# Patient Record
Sex: MALE | Race: WHITE | NOT HISPANIC OR LATINO | Employment: FULL TIME | URBAN - METROPOLITAN AREA
[De-identification: names, ages, dates, MRNs, and addresses within clinical notes are randomized per-mention and may not be internally consistent; named-entity substitution may affect disease eponyms.]

---

## 2022-02-11 ENCOUNTER — APPOINTMENT (EMERGENCY)
Dept: CT IMAGING | Facility: HOSPITAL | Age: 42
End: 2022-02-11
Payer: OTHER MISCELLANEOUS

## 2022-02-11 ENCOUNTER — HOSPITAL ENCOUNTER (EMERGENCY)
Facility: HOSPITAL | Age: 42
Discharge: HOME | End: 2022-02-11
Attending: EMERGENCY MEDICINE
Payer: OTHER MISCELLANEOUS

## 2022-02-11 VITALS
BODY MASS INDEX: 31.1 KG/M2 | HEIGHT: 69 IN | WEIGHT: 210 LBS | RESPIRATION RATE: 20 BRPM | HEART RATE: 97 BPM | DIASTOLIC BLOOD PRESSURE: 105 MMHG | TEMPERATURE: 97.8 F | SYSTOLIC BLOOD PRESSURE: 161 MMHG | OXYGEN SATURATION: 100 %

## 2022-02-11 DIAGNOSIS — M54.50 ACUTE LOW BACK PAIN: Primary | ICD-10-CM

## 2022-02-11 PROCEDURE — G1004 CDSM NDSC: HCPCS

## 2022-02-11 PROCEDURE — 96374 THER/PROPH/DIAG INJ IV PUSH: CPT

## 2022-02-11 PROCEDURE — 96375 TX/PRO/DX INJ NEW DRUG ADDON: CPT

## 2022-02-11 PROCEDURE — 72131 CT LUMBAR SPINE W/O DYE: CPT

## 2022-02-11 PROCEDURE — 99284 EMERGENCY DEPT VISIT MOD MDM: CPT

## 2022-02-11 PROCEDURE — 99285 EMERGENCY DEPT VISIT HI MDM: CPT | Performed by: PHYSICIAN ASSISTANT

## 2022-02-11 RX ORDER — METHYLPREDNISOLONE 4 MG/1
TABLET ORAL
Qty: 21 TABLET | Refills: 0 | Status: SHIPPED | OUTPATIENT
Start: 2022-02-11

## 2022-02-11 RX ORDER — DIAZEPAM 5 MG/1
5 TABLET ORAL EVERY 8 HOURS PRN
Qty: 15 TABLET | Refills: 0 | Status: SHIPPED | OUTPATIENT
Start: 2022-02-11 | End: 2022-02-16

## 2022-02-11 RX ORDER — IBUPROFEN 600 MG/1
600 TABLET ORAL EVERY 8 HOURS PRN
Qty: 30 TABLET | Refills: 0 | Status: SHIPPED | OUTPATIENT
Start: 2022-02-11

## 2022-02-11 RX ORDER — DEXAMETHASONE SODIUM PHOSPHATE 10 MG/ML
10 INJECTION, SOLUTION INTRAMUSCULAR; INTRAVENOUS ONCE
Status: COMPLETED | OUTPATIENT
Start: 2022-02-11 | End: 2022-02-11

## 2022-02-11 RX ORDER — HYDROMORPHONE HCL/PF 1 MG/ML
0.5 SYRINGE (ML) INJECTION ONCE
Status: COMPLETED | OUTPATIENT
Start: 2022-02-11 | End: 2022-02-11

## 2022-02-11 RX ORDER — ONDANSETRON 2 MG/ML
4 INJECTION INTRAMUSCULAR; INTRAVENOUS ONCE
Status: COMPLETED | OUTPATIENT
Start: 2022-02-11 | End: 2022-02-11

## 2022-02-11 RX ADMIN — HYDROMORPHONE HYDROCHLORIDE 0.5 MG: 1 INJECTION, SOLUTION INTRAMUSCULAR; INTRAVENOUS; SUBCUTANEOUS at 13:38

## 2022-02-11 RX ADMIN — ONDANSETRON 4 MG: 2 INJECTION INTRAMUSCULAR; INTRAVENOUS at 13:38

## 2022-02-11 RX ADMIN — DEXAMETHASONE SODIUM PHOSPHATE 10 MG: 10 INJECTION, SOLUTION INTRAMUSCULAR; INTRAVENOUS at 14:50

## 2022-02-11 NOTE — Clinical Note
Kamila Almeida was seen and treated in our emergency department on 2/11/2022  No restrictions            Diagnosis:     Torsten Hirsch  may return to work on return date  He may return on this date: 02/14/2022         If you have any questions or concerns, please don't hesitate to call        Breana Porter PA-C    ______________________________           _______________          _______________  Hospital Representative                              Date                                Time

## 2022-02-13 NOTE — ED PROVIDER NOTES
History  Chief Complaint   Patient presents with    Back Pain     Pt reports lower back pain after lifting an 800 lb spa  Pt unable to bend and is having a hard time walking     39 y o  male presents to the Emergency Department with chief complaint of back pain after lifting an 800 lb spa earlier today   Onset of symptoms is reported as this afternoon  Location of symptoms is reported as lower back  Quality of symptoms is reported as sharp tight pain  Severity of symptoms is reported as moderate-severe  Associated symptoms:  Denies urinary retention  Denies bowel or bladder incontinence  Denies abdominal pain  Denies fevers  denies lower extremity paralysis, or paraesthesias  Patient reports back pain causing him to not be able to walk  Denies dysuria, urinary frequency or hematuria, denies fevers, denies abdominal pain  Modifiers: Movement, bending and twisting exacerbate pain  Rest partially relieves pain  Context:  Patient reports that Pt reports lower back pain after lifting an 800 lb spa  Pt unable to bend and is having a hard time walking  Review of past visit history via EPIC demonstrates no prior visits to this ed  History provided by:  Patient and significant other   used: No    Back Pain  Associated symptoms: no abdominal pain, no chest pain, no dysuria, no fever, no headaches, no numbness and no weakness        None       History reviewed  No pertinent past medical history  Past Surgical History:   Procedure Laterality Date    HERNIA REPAIR         History reviewed  No pertinent family history  I have reviewed and agree with the history as documented  E-Cigarette/Vaping     E-Cigarette/Vaping Substances    Nicotine Yes      Social History     Tobacco Use    Smoking status: Current Every Day Smoker    Smokeless tobacco: Never Used   Vaping Use    Vaping Use: Not on file   Substance Use Topics    Alcohol use:  Yes    Drug use: Yes     Types: Marijuana Review of Systems   Constitutional: Negative for activity change, appetite change, chills, diaphoresis, fatigue, fever and unexpected weight change  HENT: Negative for congestion, dental problem, drooling, ear discharge, ear pain, facial swelling, hearing loss, mouth sores, nosebleeds, postnasal drip, rhinorrhea, sinus pressure, sinus pain, sneezing, sore throat, tinnitus, trouble swallowing and voice change  Eyes: Negative for photophobia, pain, discharge, redness, itching and visual disturbance  Respiratory: Negative for cough, choking, chest tightness, shortness of breath, wheezing and stridor  Cardiovascular: Negative for chest pain, palpitations and leg swelling  Gastrointestinal: Negative for abdominal distention, abdominal pain, anal bleeding, blood in stool, constipation, diarrhea, nausea, rectal pain and vomiting  Endocrine: Negative for cold intolerance, heat intolerance, polydipsia, polyphagia and polyuria  Genitourinary: Negative for decreased urine volume, difficulty urinating, dysuria, flank pain, frequency, hematuria and urgency  Musculoskeletal: Positive for back pain and gait problem  Negative for arthralgias, joint swelling, myalgias, neck pain and neck stiffness  Skin: Negative for color change, pallor, rash and wound  Allergic/Immunologic: Negative for environmental allergies, food allergies and immunocompromised state  Neurological: Negative for dizziness, tremors, seizures, syncope, facial asymmetry, speech difficulty, weakness, light-headedness, numbness and headaches  Hematological: Negative for adenopathy  Does not bruise/bleed easily  Psychiatric/Behavioral: Negative for agitation, confusion and hallucinations  The patient is not nervous/anxious  All other systems reviewed and are negative  Physical Exam  Physical Exam  Vitals and nursing note reviewed  Constitutional:       General: He is not in acute distress  Appearance: Normal appearance  He is well-developed  He is not diaphoretic  Comments: BP (!) 161/105 (BP Location: Right arm)   Pulse 97   Temp 97 8 °F (36 6 °C) (Oral)   Resp 20   Ht 5' 9" (1 753 m)   Wt 95 3 kg (210 lb)   SpO2 100%   BMI 31 01 kg/m²    HENT:      Head: Normocephalic and atraumatic  Right Ear: External ear normal       Left Ear: External ear normal       Nose: Nose normal       Mouth/Throat:      Pharynx: No oropharyngeal exudate  Eyes:      General: No scleral icterus  Right eye: No discharge  Left eye: No discharge  Extraocular Movements: Extraocular movements intact  Conjunctiva/sclera: Conjunctivae normal    Neck:      Thyroid: No thyromegaly  Trachea: No tracheal deviation  Cardiovascular:      Rate and Rhythm: Normal rate  Pulses: Normal pulses  Pulmonary:      Effort: Pulmonary effort is normal  No respiratory distress  Abdominal:      General: Bowel sounds are normal  There is no distension  Palpations: Abdomen is soft  There is no mass  Tenderness: There is no abdominal tenderness  There is no guarding or rebound  Musculoskeletal:         General: Tenderness and signs of injury present  No swelling or deformity  Cervical back: Normal range of motion and neck supple  No rigidity or tenderness  Comments: There is no midline thoracic spinal tenderness to palpation  There is midline lumbar spinal tenderness to palpation  There is bilateral lumbar paraspinal muscle tenderness to palpation at L4 level  No bony step offs or deformities on palpation  No saddle anesthesia  Nontender over the costovertebral angle bilaterally  Bilateral lower extremities: The patient is neurovascularly intact in the superficial and deep peroneal, sural, tibial, and saphenous nerve distributions there is normal sensation and good capillary refill within the toes  Strength 5/5 normal to bilateral lower extremities  FROM throughout BLE    No posterior calf pain or palpable cords  Lymphadenopathy:      Cervical: No cervical adenopathy  Skin:     General: Skin is warm and dry  Capillary Refill: Capillary refill takes less than 2 seconds  Coloration: Skin is not jaundiced or pale  Findings: No erythema or rash  Neurological:      General: No focal deficit present  Mental Status: He is alert and oriented to person, place, and time  Cranial Nerves: No cranial nerve deficit  Sensory: No sensory deficit  Motor: No weakness or abnormal muscle tone  Psychiatric:         Mood and Affect: Mood normal          Behavior: Behavior normal          Thought Content: Thought content normal          Judgment: Judgment normal          Vital Signs  ED Triage Vitals [02/11/22 1211]   Temperature Pulse Respirations Blood Pressure SpO2   97 8 °F (36 6 °C) 97 20 (!) 161/105 100 %      Temp Source Heart Rate Source Patient Position - Orthostatic VS BP Location FiO2 (%)   Oral Monitor Standing Right arm --      Pain Score       10 - Worst Possible Pain           Vitals:    02/11/22 1211   BP: (!) 161/105   Pulse: 97   Patient Position - Orthostatic VS: Standing         Visual Acuity      ED Medications  Medications   HYDROmorphone (DILAUDID) injection 0 5 mg (0 5 mg Intravenous Given 2/11/22 1338)   ondansetron (ZOFRAN) injection 4 mg (4 mg Intravenous Given 2/11/22 1338)   dexamethasone (PF) (DECADRON) injection 10 mg (10 mg Intravenous Given 2/11/22 1450)       Diagnostic Studies  Results Reviewed     None                 CT lumbar spine without contrast   Final Result by Arlon Lanes, MD (02/11 1425)      1  No acute osseous abnormality  2   Mild degenerative change without high-grade canal or foraminal stenosis as detailed           Workstation performed: [id number]                    Procedures  Procedures         ED Course                                             MDM  Number of Diagnoses or Management Options  Acute low back pain: new and requires workup  Diagnosis management comments: MDM: ddx includes but is not limited to:  Myofascial pain, diskogenic pain, radicular pain, muscle spasm, vertebral compression fracture, spinal stenosis, spondylosis, cancer, osteoporosis, OA, RA, consider but doubt epidural abscess, cauda equina  CT lumbar spine images independently visualized interpreted by me  Radiology report reviewed:  ALIGNMENT:  There are 5 lumbar type vertebral bodies   There is preserved normal lumbar lordosis   No spondylolisthesis  VERTEBRAL BODIES:  No acute fracture   No lytic or blastic lesion  DEGENERATIVE CHANGES:     Lower Thoracic spine:  Normal lower thoracic disc spaces )     L1-2:  Disc bulge and mild facet arthropathy   Mild canal stenosis   No significant foraminal narrowing  L2-3:  Small disc bulge   Mild facet arthropathy   No significant canal or foraminal stenosis  L3-4:  Normal disc height   No herniation   Normal facet joints   No canal or foraminal stenosis  L4-5:  Small disc bulge   Mild facet arthropathy   Mild canal narrowing   Mild foraminal stenosis  L5-S1:  Small disc bulge   Mild facet arthropathy   Mild canal stenosis   Mild bilateral foraminal narrowing  PARASPINAL SOFT TISSUES:  Minimal disc bulge   Mild facet arthropathy   No significant canal stenosis   Mild bilateral foraminal narrowing  Amount and/or Complexity of Data Reviewed  Tests in the radiology section of CPT®: ordered and reviewed  Obtain history from someone other than the patient: yes (Sig other)  Review and summarize past medical records: yes  Independent visualization of images, tracings, or specimens: yes    Risk of Complications, Morbidity, and/or Mortality  General comments: ED coarse:  54-year-old male presents for evaluation of low back pain that happened suddenly after lifting an 800 pound spine earlier today  He reports no bowel or bladder incontinence, fevers, or abdominal pain    He reports difficulty walking due to the pain  CT lumbar spine images were performed demonstrating no acute vertebral compression fracture or high-grade canal or foraminal stenosis  There are mild degenerative changes noted  Patient was given Dilaudid here in the emergency department with relief of pain  He was able to get up and ambulate here in emergency department without restriction  He was able passes urine here in emergency department  Discussed diagnosis of low back pain  Discussed treatment plan including rest, ice, avoidance of bending lifting or twisting for the next few days  Discussed use of prescribed medications for treatment of pain  Will refer to comprehensive spine clinic  Follow-up with primary care physician in 3-5 days recheck and further evaluation of symptoms  Stable for discharge  No indication for admission  I discussed diagnosis and treatment plan with patient at bedside  Extended discussion with patient regarding the diagnosis, pathophysiology, expectant coarse and treatment plan  Instructed to follow up with pcp and recommended specialist in 3-5 days  Reviewed reasons to return to ed  Patient verbalized understanding of diagnosis and agreement with discharge plan of care as well as understanding of reasons to return to ed  I have reasonably determine that electronically prescribing a controlled substance would be impractical for the patient to obtain the controlled substance prescribed by electronic prescription or would cause an untimely delay resulting in an adverse impact on the patient's medical condition        Patient was seen during the outbreak of the corona virus epidemic   Resources are limited due to the severity of patient illnesses associated with virus   Testing is also limited at this time   Discussed with patient at the time of this evaluation   Due to the fact that limited resources are available -treatment options are limited        Patient Progress  Patient progress: stable      Disposition  Final diagnoses:   Acute low back pain     Time reflects when diagnosis was documented in both MDM as applicable and the Disposition within this note     Time User Action Codes Description Comment    2/11/2022  2:42 PM Rex Jeffrey Satish [M54 50] Acute low back pain       ED Disposition     ED Disposition Condition Date/Time Comment    Discharge Stable Fri Feb 11, 2022  2:42 PM Daiva Harms discharge to home/self care  Follow-up Information     Follow up With Specialties Details Why Contact Info Additional [address] [facility] Emergency Department Emergency Medicine Go to  If symptoms worsen [address] [facility] [id number] [facility] Emergency Department, [address] [place], [place], [address] [facility] Susana Sapp MD Family Medicine Call in 2 days for further evaluation of symptoms [address] [facility]  [address]  [place] [place] [phone]       [facility] Physical Therapy Call in 1 day for further evaluation of symptoms [phone]    Evelin Landaverde MD Orthopedic Surgery Call in 2 weeks if symptoms do not resolve   [address] [facility]  [address]  [facility] [address]  [phone]             Discharge Medication List as of 2/11/2022  2:45 PM      START taking these medications    Details   diazepam (VALIUM) 5 mg tablet Take 1 tablet (5 mg total) by mouth every 8 (eight) hours as needed for muscle spasms for up to 5 days, Starting Fri 2/11/2022, Until Wed 2/16/2022 at 2359, Normal      ibuprofen (MOTRIN) 600 mg tablet Take 1 tablet (600 mg total) by mouth every 8 (eight) hours as needed for mild pain or moderate pain (back pain/initial rx ), Starting Fri 2/11/2022, Normal      methylPREDNISolone 4 MG tablet therapy pack Use as directed on package, Normal                 PDMP Review     None          ED Provider  Electronically Signed by Yuki Gillis PA-C  02/12/22 2105

## 2022-02-14 ENCOUNTER — TELEPHONE (OUTPATIENT)
Dept: PHYSICAL THERAPY | Facility: OTHER | Age: 42
End: 2022-02-14

## 2022-02-14 NOTE — TELEPHONE ENCOUNTER
Message left for patient regarding referral entered for [facility]  Nurse requested patient CB if needed and leave Full Name &  on VM  One of the nurses will return the call to discuss the program further  Referral deferred for f/u    Work injury? HI?

## 2022-02-21 ENCOUNTER — TELEPHONE (OUTPATIENT)
Dept: PHYSICAL THERAPY | Facility: OTHER | Age: 42
End: 2022-02-21

## 2022-02-21 NOTE — TELEPHONE ENCOUNTER
Nurse reached out to discuss recent referral entered for  Comprehensive Spine program and offerings  Nurse reviewed the triage and referral process with him  Patient thanked nurse for calling, but stated, " I'm doing much better, but I do appreciate the concern"  " You can cancel that referral"  Nurse respected patients decision  Patient declined to participate in the program at this time  Nurse confirmed patient has [facility] contact information and encouraged to call program back if needed in the future  Patient agreed and very appreciative of call and discussion  Nurse wished him well and referral closed per protocol